# Patient Record
Sex: FEMALE | Race: WHITE
[De-identification: names, ages, dates, MRNs, and addresses within clinical notes are randomized per-mention and may not be internally consistent; named-entity substitution may affect disease eponyms.]

---

## 2018-05-14 ENCOUNTER — HOSPITAL ENCOUNTER (EMERGENCY)
Dept: HOSPITAL 17 - HOBED | Age: 23
Discharge: HOME | End: 2018-05-14
Payer: COMMERCIAL

## 2018-05-14 DIAGNOSIS — R11.0: ICD-10-CM

## 2018-05-14 DIAGNOSIS — O26.893: Primary | ICD-10-CM

## 2018-05-14 DIAGNOSIS — Z3A.36: ICD-10-CM

## 2018-05-14 PROCEDURE — 59025 FETAL NON-STRESS TEST: CPT

## 2018-05-14 NOTE — PD
HPI


Chief Complaint


Just did not feel good today


Date Seen:  May 14, 2018


Time Seen:  22:40


Travel History


International Travel<30 Days:  No


Contact w/Intl Traveler<30Days:  No


Known Affected Area:  No





History of Present Illness


HPI


23-year-old white female  36 weeks sees Dr. Whitehead for prenatal care and 

presents complaining today of hot flashes and nausea with no vomiting, no 

bleeding, leakage, or contractions.  The patient states she feels better now.  

NST is reactive no contractions


Weeks Gestation:  36


Para:  0


:  2





History


Obstetric History


Obstetric History


1 early pregnancy loss





Social History


Alcohol Use:  No


Tobacco Use:  No


Substance Abuse:  No





Allergies-Medications


(Allergen,Severity, Reaction):  


Coded Allergies:  


     No Known Allergies (Unverified , 2/12/15)


Home Meds


Active Scripts


Ondansetron (Zofran ODT) 4 Mg Tab, 4 MG SL QID Y for NAUSEA OR VOMITING, #10 TAB


   FOR NAUSEA/VOMITING


   Prov:Jasmin Gaspar MD         2/12/15


Reported Medications


Amoxicillin (Amoxicillin) Unknown Strength Cap, PO TID, CAP


   2/12/15


Miscellaneous (Birth Control Pills)  Tab, 1 TAB PO HS, TAB


   2/12/15





Review of Systems


General / Constitutional:  No: Fever, Weight Gain, Chills, Other


Eyes:  No: Diploplia, Blurred Vision, Visual changes, Pain, Photophobia


HENT:  No: Headaches, Vertigo, Lightheadedness


Cardiovascular:  No: Irregular Rhythm, Chest Pain or Discomfort, Palpitations, 

Tachycardia, Syncope, Varicosities, Edema, Cyanosis


Respiratory:  No: Cough, Short of Breath, Other


Gastrointestinal:  No: Nausea, Vomiting, Diarrhea


Genitourinary:  No: Decreased Urinary Output, Oliguria


Musculoskeletal:  No: Limited ROM, Weakness, Cramping, Edema, Pain


Skin:  No Rash, No Itching, No Dryness, No Lumps, No Change in Pigmentation, No 

Change in Nails, No Alopecia, No Lesions


Neurologic:  No: Weakness, Dizziness, Syncope, Focal Abnormalities, 

Coordination Problem, Headache, Slurred Speech, Seizures


Psychiatric:  No: Depression, Suicidal Ideations, Homicidal Ideation


Endocrine:  No: Heat Intolerance, Cold Intolerance, Polydipsia, Polyuria, Other





Physical Exam


Narrative


GENERAL: Well-nourished, well-developed patient.


SKIN: Warm and dry.


HEAD: Normocephalic and atraumatic.


EYES: No scleral icterus. No injection or drainage. 


ENT: No nasal drainage noted. Mucous membranes pink. Airway patent.


NECK: Supple, trachea midline. No JVD.


CARDIOVASCULAR: Regular rate and rhythm without murmurs, gallops, or rubs. 


RESPIRATORY: Breath sounds equal bilaterally. No accessory muscle use.


BREASTS: Bilateral exam showed no masses , no retractions, no nipple discharge.


ABDOMEN/GI: Abdomen soft, non-tender, bowel sounds present, no rebound, no 

guarding 


   Gravid to [-36] weeks size


   Fundal Height: [-36]


GENITOURINARY: 


   External Genitalia: intact and normal in appearance


 


   Membranes: [intact  ]


   Uterine Contractions: [none-]


FHT's: 


   Category: [1-]   


   Baseline: [-133]   


   Reactive: [R-]   


   Variability: [-mod]  


   Decels: [-0]  


EXTREMITIES: No cyanosis or edema.


BACK: Nontender without obvious deformity. No CVA tenderness.


NEUROLOGICAL: Awake and alert. Motor and sensory grossly within normal limits. 

Five out of 5 muscle strength in all muscle groups. Normal speech.





Data


Data


Labs


Urine dip on OB ED is negative





MDM


Interpretation(s)


23-year-old white female  at 36 weeks who presents complaining of hot 

flashes and nausea today.  She had no vomiting.  She felt she describes a just 

not well at all for a while and then that resolved and now she feels okay, NST 

is reactive, no contractions seen.  Urinalysis negative


Plan


Plan to discharge patient home to observation and she will hydrate at home, use 

Tylenol as needed and heating pad or hot bath for symptom relief.  She is to 

follow-up with Dr. Whitehead for any more issues


Diagnosis


Diagnosis:  


 Primary Impression:  


 Malaise


 Additional Impressions:  


 Hot flashes


 Nausea alone


 36 weeks gestation of pregnancy


Disposition:  01 DISCHARGE HOME


Condition:  Stable











Dilshad Perez II, MD May 14, 2018 22:46

## 2018-05-25 ENCOUNTER — HOSPITAL ENCOUNTER (EMERGENCY)
Dept: HOSPITAL 17 - HOBED | Age: 23
Discharge: HOME | End: 2018-05-25
Payer: COMMERCIAL

## 2018-05-25 DIAGNOSIS — Z3A.37: ICD-10-CM

## 2018-05-25 DIAGNOSIS — O16.3: Primary | ICD-10-CM

## 2018-05-25 LAB
ALBUMIN SERPL-MCNC: 2.8 GM/DL (ref 3.4–5)
ALP SERPL-CCNC: 132 U/L (ref 45–117)
ALT SERPL-CCNC: 25 U/L (ref 10–53)
AST SERPL-CCNC: 17 U/L (ref 15–37)
BACTERIA #/AREA URNS HPF: (no result) /HPF
BASOPHILS # BLD AUTO: 0 TH/MM3 (ref 0–0.2)
BASOPHILS NFR BLD: 0.2 % (ref 0–2)
BILIRUB SERPL-MCNC: 0.4 MG/DL (ref 0.2–1)
BUN SERPL-MCNC: 9 MG/DL (ref 7–18)
CALCIUM SERPL-MCNC: 9 MG/DL (ref 8.5–10.1)
CHLORIDE SERPL-SCNC: 107 MEQ/L (ref 98–107)
COLOR UR: YELLOW
CREAT SERPL-MCNC: 0.82 MG/DL (ref 0.5–1)
EOSINOPHIL # BLD: 0 TH/MM3 (ref 0–0.4)
EOSINOPHIL NFR BLD: 0.6 % (ref 0–4)
ERYTHROCYTE [DISTWIDTH] IN BLOOD BY AUTOMATED COUNT: 13.3 % (ref 11.6–17.2)
GFR SERPLBLD BASED ON 1.73 SQ M-ARVRAT: 85 ML/MIN (ref 89–?)
GLUCOSE SERPL-MCNC: 108 MG/DL (ref 74–106)
GLUCOSE UR STRIP-MCNC: (no result) MG/DL
HCO3 BLD-SCNC: 20.2 MEQ/L (ref 21–32)
HCT VFR BLD CALC: 38.6 % (ref 35–46)
HGB BLD-MCNC: 13.3 GM/DL (ref 11.6–15.3)
HGB UR QL STRIP: (no result)
KETONES UR STRIP-MCNC: (no result) MG/DL
LYMPHOCYTES # BLD AUTO: 1.5 TH/MM3 (ref 1–4.8)
LYMPHOCYTES NFR BLD AUTO: 17.7 % (ref 9–44)
MCH RBC QN AUTO: 30.8 PG (ref 27–34)
MCHC RBC AUTO-ENTMCNC: 34.4 % (ref 32–36)
MCV RBC AUTO: 89.5 FL (ref 80–100)
MONOCYTE #: 0.6 TH/MM3 (ref 0–0.9)
MONOCYTES NFR BLD: 7.1 % (ref 0–8)
NEUTROPHILS # BLD AUTO: 6.1 TH/MM3 (ref 1.8–7.7)
NEUTROPHILS NFR BLD AUTO: 74.4 % (ref 16–70)
NITRITE UR QL STRIP: (no result)
PLATELET # BLD: 263 TH/MM3 (ref 150–450)
PMV BLD AUTO: 7.8 FL (ref 7–11)
PROT SERPL-MCNC: 6.7 GM/DL (ref 6.4–8.2)
RBC # BLD AUTO: 4.31 MIL/MM3 (ref 4–5.3)
SODIUM SERPL-SCNC: 137 MEQ/L (ref 136–145)
SP GR UR STRIP: 1.02 (ref 1–1.03)
SQUAMOUS #/AREA URNS HPF: 15 /HPF (ref 0–5)
URINE LEUKOCYTE ESTERASE: (no result)
WBC # BLD AUTO: 8.2 TH/MM3 (ref 4–11)

## 2018-05-25 PROCEDURE — 81001 URINALYSIS AUTO W/SCOPE: CPT

## 2018-05-25 PROCEDURE — 85025 COMPLETE CBC W/AUTO DIFF WBC: CPT

## 2018-05-25 PROCEDURE — 59025 FETAL NON-STRESS TEST: CPT

## 2018-05-25 PROCEDURE — 80053 COMPREHEN METABOLIC PANEL: CPT

## 2018-05-25 PROCEDURE — 36415 COLL VENOUS BLD VENIPUNCTURE: CPT

## 2018-05-25 NOTE — PD
HPI


Chief Complaint


hypertension in office


Date Seen:  May 25, 2018


Time Seen:  13:00


Travel History


International Travel<30 Days:  No


Contact w/Intl Traveler<30Days:  No


Known Affected Area:  No





History of Present Illness


HPI


22 yo at 37 6/7 weeks with BP in office of 140/100 no HA or visual disturbance. 

She is doing well and has had good PNC


Weeks Gestation:  37


Para:  0


:  1


Last Menstrual Period:  May 25, 2018





History


Past Medical History


Medical History:  Denies Significant Hx





Past Surgical History


Surgical History:  No Previous Surgery





Family History


Family History:  Negative





Social History


Alcohol Use:  No


Tobacco Use:  No


Substance Abuse:  No





Allergies-Medications


(Allergen,Severity, Reaction):  


Coded Allergies:  


     No Known Allergies (Unverified , 2/12/15)


Home Meds


Active Scripts


Ondansetron (Zofran ODT) 4 Mg Tab, 4 MG SL QID Y for NAUSEA OR VOMITING, #10 TAB


   FOR NAUSEA/VOMITING


   Prov:Jasmin Gaspar MD         2/12/15


Reported Medications


Amoxicillin (Amoxicillin) Unknown Strength Cap, PO TID, CAP


   2/12/15


Miscellaneous (Birth Control Pills)  Tab, 1 TAB PO HS, TAB


   2/12/15





Review of Systems


Except as stated in HPI:  all other systems reviewed are Neg





Physical Exam


Narrative


GENERAL: Well-nourished, well-developed patient.


SKIN: Warm and dry.


HEAD: Normocephalic and atraumatic.


EYES: No scleral icterus. No injection or drainage. 


ENT: No nasal drainage noted. Mucous membranes pink. Airway patent.


NECK: Supple, trachea midline. No JVD.


CARDIOVASCULAR: Regular rate and rhythm without murmurs, gallops, or rubs. 


RESPIRATORY: Breath sounds equal bilaterally. No accessory muscle use.


BREASTS: Bilateral exam showed no masses , no retractions, no nipple discharge.


ABDOMEN/GI: Abdomen soft, non-tender, bowel sounds present, no rebound, no 

guarding 


   Gravid to 37 weeks size


   Fundal Height: [-]


GENITOURINARY: 


   External Genitalia: intact and normal in appearance


   BUS glands: [-]


   Cervix: [-]


   Dilatation: ft        


   Effacement: [-]          


   Station: -2


   Presentation: [-]        


   Membranes: [intact


   Uterine Contractions: [-]


FHT's: 


   Category: 1   


   Baseline: [-]   


   Reactive: [-]   


   Variability: [-]  


   Decels: [-]  


EXTREMITIES: No cyanosis or edema.


BACK: Nontender without obvious deformity. No CVA tenderness.


NEUROLOGICAL: Awake and alert. Motor and sensory grossly within normal limits. 

Five out of 5 muscle strength in all muscle groups. Normal speech.





Data


Data


Vital Signs Reviewed:  Yes


Orders





 Orders


Complete Blood Count With Diff (18 11:53)


Comprehensive Metabolic Panel (18 11:53)


Urinalysis - C+S If Indicated (18 11:53)


Group B Strep:  Negative


Labs





Laboratory Tests








Test


  18


11:48


 


White Blood Count 8.2 


 


Red Blood Count 4.31 


 


Hemoglobin 13.3 


 


Hematocrit 38.6 


 


Mean Corpuscular Volume 89.5 


 


Mean Corpuscular Hemoglobin 30.8 


 


Mean Corpuscular Hemoglobin


Concent 34.4 


 


 


Red Cell Distribution Width 13.3 


 


Platelet Count 263 


 


Mean Platelet Volume 7.8 


 


Neutrophils (%) (Auto) 74.4 


 


Lymphocytes (%) (Auto) 17.7 


 


Monocytes (%) (Auto) 7.1 


 


Eosinophils (%) (Auto) 0.6 


 


Basophils (%) (Auto) 0.2 


 


Neutrophils # (Auto) 6.1 


 


Lymphocytes # (Auto) 1.5 


 


Monocytes # (Auto) 0.6 


 


Eosinophils # (Auto) 0.0 


 


Basophils # (Auto) 0.0 


 


CBC Comment DIFF FINAL 


 


Differential Comment  


 


Urine Color YELLOW 


 


Urine Turbidity HAZY 


 


Urine pH 5.5 


 


Urine Specific Gravity 1.023 


 


Urine Protein TRACE 


 


Urine Glucose (UA) NEG 


 


Urine Ketones NEG 


 


Urine Occult Blood NEG 


 


Urine Nitrite NEG 


 


Urine Bilirubin NEG 


 


Urine Urobilinogen LESS THAN 2.0 


 


Urine Leukocyte Esterase MOD 


 


Urine RBC 7 


 


Urine WBC 4 


 


Urine Squamous Epithelial


Cells 15 


 


 


Urine Bacteria OCC 


 


Microscopic Urinalysis Comment


  CULT NOT


INDICATED


 


Blood Urea Nitrogen 9 


 


Creatinine 0.82 


 


Random Glucose 108 


 


Total Protein 6.7 


 


Albumin 2.8 


 


Calcium Level 9.0 


 


Alkaline Phosphatase 132 


 


Aspartate Amino Transf


(AST/SGOT) 17 


 


 


Alanine Aminotransferase


(ALT/SGPT) 25 


 


 


Total Bilirubin 0.4 


 


Sodium Level 137 


 


Potassium Level 3.7 


 


Chloride Level 107 


 


Carbon Dioxide Level 20.2 


 


Anion Gap 10 


 


Estimat Glomerular Filtration


Rate 85 


 











MDM


Diagnosis


Diagnosis:  


 Primary Impression:  


 37 weeks gestation of pregnancy


 Additional Impression:  


 Hypertension


Disposition:  01 DISCHARGE HOME


Condition:  Good


Patient Instructions:  General Instructions, Having Your Baby: The Labor 

Process (GEN), Fetal Movement (ED)


Departure Forms:  Tests/Procedures











Mickey De La Rosa MD May 25, 2018 13:18

## 2018-05-25 NOTE — HHI.DCPOC
Discharge Care Plan


Diagnosis:  


(1) 37 weeks gestation of pregnancy


Report Symptoms to Your Doctor


-Temperature above 100.5 degrees


-Redness, of incision or excessive or foul smelling drainage


-Unusual pain or calf pain


-Increased vaginal bleeding


-Painful or difficulty urinating


-Feelings of extreme sadness or anxiety after 2 weeks


Goals to Promote Your Health


* To prevent worsening of your condition and complications


* To maintain your health at the optimal level


Directions to Meet Your Goals


*** Take your medications as prescribed


*** Follow your dietary instruction


*** Follow activity as directed


*** Ensure plenty of rest for recovery


*** Drink fluids for hydration








*** Keep your appointments as scheduled


*** Take your immunizations and boosters as scheduled


*** If your symptoms worsen call your PCP, if no PCP go to Urgent Care Center 

or Emergency Room***


*** Smoking is Dangerous to Your Health. Avoid second hand smoke***


***Call the 24-hour crisis hotline for domestic abuse at 1-946.991.6761***











Mickey De La Rosa MD May 25, 2018 13:19

## 2018-06-08 ENCOUNTER — HOSPITAL ENCOUNTER (INPATIENT)
Dept: HOSPITAL 17 - H2EB | Age: 23
LOS: 2 days | Discharge: HOME | End: 2018-06-10
Attending: OBSTETRICS & GYNECOLOGY | Admitting: OBSTETRICS & GYNECOLOGY
Payer: COMMERCIAL

## 2018-06-08 VITALS — HEIGHT: 69 IN | BODY MASS INDEX: 32.65 KG/M2 | WEIGHT: 220.46 LBS

## 2018-06-08 DIAGNOSIS — Z3A.39: ICD-10-CM

## 2018-06-08 LAB
AMORPHOUS SEDIMENT, URINE: (no result)
BACTERIA #/AREA URNS HPF: (no result) /HPF
BASOPHILS # BLD AUTO: 0 TH/MM3 (ref 0–0.2)
BASOPHILS NFR BLD: 0.4 % (ref 0–2)
COLOR UR: YELLOW
EOSINOPHIL # BLD: 0 TH/MM3 (ref 0–0.4)
EOSINOPHIL NFR BLD: 0.5 % (ref 0–4)
ERYTHROCYTE [DISTWIDTH] IN BLOOD BY AUTOMATED COUNT: 13.2 % (ref 11.6–17.2)
GLUCOSE UR STRIP-MCNC: (no result) MG/DL
HCT VFR BLD CALC: 37.2 % (ref 35–46)
HGB BLD-MCNC: 13.4 GM/DL (ref 11.6–15.3)
HGB UR QL STRIP: (no result)
KETONES UR STRIP-MCNC: (no result) MG/DL
LYMPHOCYTES # BLD AUTO: 1.6 TH/MM3 (ref 1–4.8)
LYMPHOCYTES NFR BLD AUTO: 19.7 % (ref 9–44)
MCH RBC QN AUTO: 31.9 PG (ref 27–34)
MCHC RBC AUTO-ENTMCNC: 36 % (ref 32–36)
MCV RBC AUTO: 88.6 FL (ref 80–100)
MONOCYTE #: 0.6 TH/MM3 (ref 0–0.9)
MONOCYTES NFR BLD: 7 % (ref 0–8)
MUCOUS THREADS #/AREA URNS LPF: (no result) /LPF
NEUTROPHILS # BLD AUTO: 6.1 TH/MM3 (ref 1.8–7.7)
NEUTROPHILS NFR BLD AUTO: 72.4 % (ref 16–70)
NITRITE UR QL STRIP: (no result)
PLATELET # BLD: 249 TH/MM3 (ref 150–450)
PMV BLD AUTO: 8.4 FL (ref 7–11)
RBC # BLD AUTO: 4.19 MIL/MM3 (ref 4–5.3)
SP GR UR STRIP: 1.03 (ref 1–1.03)
SQUAMOUS #/AREA URNS HPF: 8 /HPF (ref 0–5)
TRANS CELLS #/AREA URNS HPF: <1 /HPF
URINE LEUKOCYTE ESTERASE: (no result)
WBC # BLD AUTO: 8.4 TH/MM3 (ref 4–11)

## 2018-06-08 PROCEDURE — 86901 BLOOD TYPING SEROLOGIC RH(D): CPT

## 2018-06-08 PROCEDURE — 3E0S3BZ INTRODUCTION OF ANESTHETIC AGENT INTO EPIDURAL SPACE, PERCUTANEOUS APPROACH: ICD-10-PCS | Performed by: OBSTETRICS & GYNECOLOGY

## 2018-06-08 PROCEDURE — 86900 BLOOD TYPING SEROLOGIC ABO: CPT

## 2018-06-08 PROCEDURE — 59025 FETAL NON-STRESS TEST: CPT

## 2018-06-08 PROCEDURE — 80307 DRUG TEST PRSMV CHEM ANLYZR: CPT

## 2018-06-08 PROCEDURE — 90715 TDAP VACCINE 7 YRS/> IM: CPT

## 2018-06-08 PROCEDURE — 85025 COMPLETE CBC W/AUTO DIFF WBC: CPT

## 2018-06-08 PROCEDURE — 0KQM0ZZ REPAIR PERINEUM MUSCLE, OPEN APPROACH: ICD-10-PCS | Performed by: OBSTETRICS & GYNECOLOGY

## 2018-06-08 PROCEDURE — G0481 DRUG TEST DEF 8-14 CLASSES: HCPCS

## 2018-06-08 PROCEDURE — 81001 URINALYSIS AUTO W/SCOPE: CPT

## 2018-06-08 RX ADMIN — BENZOCAINE PRN SPRAY: 11.4 AEROSOL, SPRAY TOPICAL at 22:50

## 2018-06-08 RX ADMIN — WITCH HAZEL PRN APPLIC: 500 SOLUTION RECTAL; TOPICAL at 22:50

## 2018-06-08 NOTE — PD.OB.DELI
Weeks gestation:  39


Gest age assessed date:  2018


Gest age assessed time:  09:00


Pt started active labor?:  Yes


Active labor start date:  2018


Active labor start time:  09:00


Medical induction of labor?:  No


Artificial rupture of membrane:  Yes


Artificial ROM date:  2018


Artifical ROM time:  13:00


Anesthesia:  Epidural


Episiotomy:  None


Vaginal Delivery:  Normal, Spontaneous


Presentation:  Occiput anterior


Nuchal Cord:  None


Delayed cord clamping (45 sec):  Yes


Infant:  Female, Single


Delivery date:  2018


Delivery time:  20:30


One Minute APGAR:  9


Five Minute APGAR:  9


Placenta:  Spontaneous delivery, Intact


Laceration:  Vaginal laceration, 2 deg


Repair:  Chromic running


Estimated blood loss:  300











Mickey De La Rosa MD 2018 20:51

## 2018-06-08 NOTE — HHI.HP
HPI


Chief Complaint


induction at 39 weeks


Date Seen:  2018


Time Seen:  09:00


Travel History


International Travel<30 Days:  No


Contact w/Intl Traveler<30Days:  No


Known Affected Area:  No





History of Present Illness


HPI


22 yo WF  here for induction. She is 3 cm and ok with AROM and pitocin


Weeks Gestation:  39


Para:  0


:  1





History


Past Medical History


Medical History:  Denies Significant Hx





Past Surgical History


Surgical History:  No Previous Surgery





Family History


Family History:  





Social History


Alcohol Use:  No


Tobacco Use:  No


Substance Abuse:  No





Allergies-Medications


(Allergen,Severity, Reaction):  


Coded Allergies:  


     No Known Allergies (Unverified  Allergy, Unknown, 18)


Home Meds


Active Scripts


Ondansetron (Zofran ODT) 4 Mg Tab, 4 MG SL QID Y for NAUSEA OR VOMITING, #10 TAB


   FOR NAUSEA/VOMITING


   Prov:Jasmin Gaspar MD         2/12/15


Reported Medications


Amoxicillin (Amoxicillin) Unknown Strength Cap, PO TID, CAP


   2/12/15


Miscellaneous (Birth Control Pills)  Tab, 1 TAB PO HS, TAB


   2/12/15





Review of Systems


Except as stated in HPI:  all other systems reviewed are Neg





Physical Exam


Narrative


GENERAL: Well-nourished, well-developed patient.


SKIN: Warm and dry.


HEAD: Normocephalic and atraumatic.


EYES: No scleral icterus. No injection or drainage. 


ENT: No nasal drainage noted. Mucous membranes pink. Airway patent.


NECK: Supple, trachea midline. No JVD.


CARDIOVASCULAR: Regular rate and rhythm without murmurs, gallops, or rubs. 


RESPIRATORY: Breath sounds equal bilaterally. No accessory muscle use.


BREASTS: Bilateral exam showed no masses , no retractions, no nipple discharge.


ABDOMEN/GI: Abdomen soft, non-tender, bowel sounds present, no rebound, no 

guarding 


   Gravid to 39 weeks size


   Fundal Height: [-]


GENITOURINARY: 


   External Genitalia: intact and normal in appearance


   BUS glands: [-]


   Cervix: [-]


   Dilatation: 3         


   Effacement: [-]          


   Station: [-]  


   Presentation: vtx       


   Membranes: 


   Uterine Contractions: [-]


FHT's: 


   Category: 1 


   Baseline: [-]   


   Reactive: [-]   


   Variability: [-]  


   Decels: [-]  


EXTREMITIES: No cyanosis or edema.


BACK: Nontender without obvious deformity. No CVA tenderness.


NEUROLOGICAL: Awake and alert. Motor and sensory grossly within normal limits. 

Five out of 5 muscle strength in all muscle groups. Normal speech.





Caprini VTE Risk Assessment


Caprini VTE Risk Assessment:  No/Low Risk (score <= 1)


Caprini Risk Assessment Model











 Point Value = 1          Point Value = 2  Point Value = 3  Point Value = 5


 


Age 41-60


Minor surgery


BMI > 25 kg/m2


Swollen legs


Varicose veins


Pregnancy or postpartum


History of unexplained or recurrent


   spontaneous 


Oral contraceptives or hormone


   replacement


Sepsis (< 1 month)


Serious lung disease, including


   pneumonia (< 1 month)


Abnormal pulmonary function


Acute myocardial infarction


Congestive heart failure (< 1 month)


History of inflammatory bowel disease


Medical patient at bed rest Age 61-74


Arthroscopic surgery


Major open surgery (> 45 min)


Laparoscopic surgery (> 45 min)


Malignancy


Confined to bed (> 72 hours)


Immobilizing plaster cast


Central venous access Age >= 75


History of VTE


Family history of VTE


Factor V Leiden


Prothrombin [id number]


Lupus anticoagulant


Anticardiolipin antibodies


Elevated serum homocysteine


Heparin-induced thrombocytopenia


Other congenital or acquired


   thrombophilia Stroke (< 1 month)


Elective arthroplasty


Hip, pelvis, or leg fracture


Acute spinal cord injury (< 1 month)








Prophylaxis Regimen











   Total Risk


Factor Score Risk Level Prophylaxis Regimen


 


0-1      Low Early ambulation


 


2 Moderate Order ONE of the following:


*Sequential Compression Device (SCD)


*Heparin 5000 units SQ BID


 


3-4 Higher Order ONE of the following medications:


*Heparin 5000 units SQ TID


*Enoxaparin/Lovenox 40 mg SQ daily (WT < 150 kg, CrCl > 30 mL/min)


*Enoxaparin/Lovenox 30 mg SQ daily (WT < 150 kg, CrCl > 10-29 mL/min)


*Enoxaparin/Lovenox 30 mg SQ BID (WT < 150 kg, CrCl > 30 mL/min)


AND/OR


*Sequential Compression Device (SCD)


 


5 or more Highest Order ONE of the following medications:


*Heparin 5000 units SQ TID (Preferred with Epidurals)


*Enoxaparin/Lovenox 40 mg SQ daily (WT < 150 kg, CrCl > 30 mL/min)


*Enoxaparin/Lovenox 30 mg SQ daily (WT < 150 kg, CrCl > 10-29 mL/min)


*Enoxaparin/Lovenox 30 mg SQ BID (WT < 150 kg, CrCl > 30 mL/min)


AND


*Sequential Compression Device (SCD)











Data


Data


Vital Signs Reviewed:  Yes


Orders





 Orders


Admit To Inpatient (18 )


Code Status (18 09:14)


Vital Signs (Adult) .Per protocol (18 09:14)


Fetal Heart (18 09:14)


Amnioinfusion (18 09:14)


Urinary Catheter Management .ONCE (18 09:14)


Lactated Ringer's 1000 Ml Inj (Lr 1000 M (18 09:14)


Lactated Ringer's 1000 Ml Inj (Lr 1000 M (18 09:14)


Sodium Chlorid 0.9% 500 Ml Inj (Ns 500 M (18 09:15)


Sodium Chlor 0.9% 1000 Ml Inj (Ns 1000 M (18 09:34)


Lidocaine 1% Inj (50 Ml) (Xylocaine 1% I (18 09:15)


Citric Acid-Sodium Citrate Liq (Bicitra (18 09:15)


Fentanyl Inj (Fentanyl Inj) (18 09:15)


Fentanyl Inj (Fentanyl Inj) (18 09:15)


Complete Blood Count With Diff (18 09:14)


Hold Clot (18 09:14)


Abo/Rh Blood Type (18 09:14)


Urinalysis - C+S If Indicated (18 09:14)


Ob/Psych Drug Screen, Urine (18 09:14)


Resp Oxygen Non Rebreathe Mask (18 )


^ Epidural / Intrathecal Infus (18 09:14)


Oxytocin 30 Units-500ml Premix (Pitocin (18 09:15)


Lidocaine 1% Inj (50 Ml) (Xylocaine 1% I (18 09:15)


Light Mineral Oil (Muri-Lube Oil) (18 09:15)


Inpatient Certification (18 )


Specimen To Be Collected PRN (18 09:14)


Specimen To Be Collected PRN (18 09:14)


^ Non Stress Test (18 09:14)


Response To Medication .Post New Med Administration, Reaction (18 09:14)


^ Discontinue Medication (18 09:14)


Oxytocin 30 Units-500ml Premix (Pitocin (18 09:15)


Diet Clear Liquid (18 Lunch)


Fentanyl 2mcg-Bupiv 0.125% Inj (Fentanyl (18 14:29)


Ephedrine/Ns 25 Mg/5 Ml Syr (Ephedrine/N (18 14:29)


Lido-Epi Pf 1.5%-1:200,000 Inj (Xylocain (18 14:32)


^ Place On Chart (18 )


^ Medication Indications (18 )


Consent (18 )


^ No Systemic Narcotics (18 )


^ Call Anesthesiologist (18 )


^ Discontinue Epidural Cathete (18 )


Anticoagulant Alert (18 )


^ Epidural Alert (18 )


Nursing Information (Misc Nursing Inform (18 15:45)


Nursing Information (Misc Nursing Inform (18 15:45)


Fentanyl Inj (Fentanyl Inj) (18 15:45)


Fentanyl 2mcg-Bupiv 0.125% Inj (Fentanyl (18 15:45)


Ephedrine/Ns 25 Mg/5 Ml Syr (Ephedrine/N (18 15:45)


Group B Strep:  Negative


Labs





Laboratory Tests








Test


  18


09:45


 


White Blood Count 8.4 


 


Red Blood Count 4.19 


 


Hemoglobin 13.4 


 


Hematocrit 37.2 


 


Mean Corpuscular Volume 88.6 


 


Mean Corpuscular Hemoglobin 31.9 


 


Mean Corpuscular Hemoglobin


Concent 36.0 


 


 


Red Cell Distribution Width 13.2 


 


Platelet Count 249 


 


Mean Platelet Volume 8.4 


 


Neutrophils (%) (Auto) 72.4 


 


Lymphocytes (%) (Auto) 19.7 


 


Monocytes (%) (Auto) 7.0 


 


Eosinophils (%) (Auto) 0.5 


 


Basophils (%) (Auto) 0.4 


 


Neutrophils # (Auto) 6.1 


 


Lymphocytes # (Auto) 1.6 


 


Monocytes # (Auto) 0.6 


 


Eosinophils # (Auto) 0.0 


 


Basophils # (Auto) 0.0 


 


CBC Comment AUTO DIFF 


 


Differential Comment


  AUTO DIFF


CONFIRMED


 


Urine Color YELLOW 


 


Urine Turbidity HAZY 


 


Urine pH 5.5 


 


Urine Specific Gravity 1.027 


 


Urine Protein TRACE 


 


Urine Glucose (UA) NEG 


 


Urine Ketones NEG 


 


Urine Occult Blood NEG 


 


Urine Nitrite NEG 


 


Urine Bilirubin NEG 


 


Urine Urobilinogen LESS THAN 2.0 


 


Urine Leukocyte Esterase SMALL 


 


Urine RBC 2 


 


Urine WBC 5 


 


Urine Squamous Epithelial


Cells 8 


 


 


Urine Transitional Epithelial


Cells <1 


 


 


Urine Amorphous Sediment RARE 


 


Urine Bacteria OCC 


 


Urine Mucus FEW 


 


Microscopic Urinalysis Comment


  CULT NOT


INDICATED


 


Urine Opiates Screen NEG 


 


Urine Barbiturates Screen NEG 


 


Urine Amphetamines Screen NEG 


 


Urine Benzodiazepines Screen NEG 


 


Urine Cocaine Screen NEG 


 


Urine Cannabinoids Screen NEG 











Assessment/Plan


Problem List:  


(1) 39 weeks gestation of pregnancy


ICD Codes:  Z3A.39 - 39 weeks gestation of pregnancy


Assessment and Plan


induction with favorable Mickey Ochoa MD 2018 17:14

## 2018-06-09 VITALS
DIASTOLIC BLOOD PRESSURE: 62 MMHG | SYSTOLIC BLOOD PRESSURE: 114 MMHG | TEMPERATURE: 98.9 F | HEART RATE: 74 BPM | RESPIRATION RATE: 17 BRPM

## 2018-06-09 RX ADMIN — IBUPROFEN PRN MG: 800 TABLET, FILM COATED ORAL at 20:44

## 2018-06-09 RX ADMIN — IBUPROFEN PRN MG: 800 TABLET, FILM COATED ORAL at 12:41

## 2018-06-09 RX ADMIN — ACETAMINOPHEN PRN MG: 325 TABLET ORAL at 23:48

## 2018-06-09 RX ADMIN — WITCH HAZEL PRN APPLIC: 500 SOLUTION RECTAL; TOPICAL at 20:43

## 2018-06-09 RX ADMIN — ACETAMINOPHEN PRN MG: 325 TABLET ORAL at 11:09

## 2018-06-09 RX ADMIN — IBUPROFEN PRN MG: 800 TABLET, FILM COATED ORAL at 04:28

## 2018-06-09 RX ADMIN — STANDARDIZED SENNA CONCENTRATE AND DOCUSATE SODIUM PRN TAB: 8.6; 5 TABLET, FILM COATED ORAL at 20:43

## 2018-06-09 NOTE — HHI.DCPOC
Discharge Care Plan


Diagnosis:  


(1) Spontaneous vaginal delivery


Report Symptoms to Your Doctor


-Temperature above 100.5 degrees


-Redness, of incision or excessive or foul smelling drainage


-Unusual pain or calf pain


-Increased vaginal bleeding


-Painful or difficulty urinating


-Feelings of extreme sadness or anxiety after 2 weeks


Goals to Promote Your Health


* To prevent worsening of your condition and complications


* To maintain your health at the optimal level


Directions to Meet Your Goals


*** Take your medications as prescribed


*** Follow your dietary instruction


*** Follow activity as directed


*** Ensure plenty of rest for recovery


*** Drink fluids for hydration








*** Keep your appointments as scheduled


*** Take your immunizations and boosters as scheduled


*** If your symptoms worsen call your PCP, if no PCP go to Urgent Care Center 

or Emergency Room***


*** Smoking is Dangerous to Your Health. Avoid second hand smoke***


***Call the 24-hour crisis hotline for domestic abuse at 1-202.903.4220***











Mickey De La Rosa MD Jun 9, 2018 07:56

## 2018-06-09 NOTE — HHI.OB
Subjective


Post Partum Day:  1


Remarks


doing well





Objective


Vitals/I&O











Intake & Output  


 


 6/9/18 6/9/18





 07:00 19:00


 


Intake Total 500 ml 


 


Balance 500 ml 


 


  


 


Intake IV Total 500 ml 








Objective Remarks


GENERAL: Well-nourished, well-developed patient.


.


ABDOMEN/GI: Abdomen soft, non-tender. 


   Fundus: Firm, non-tender at umbilicus.


GENITOURINARY: Light to moderate bleeding.


EXTREMITIES: No cyanosis or edema, non-tender, without signs of DVT.


Medications and IVs





Current Medications








 Medications


  (Trade)  Dose


 Ordered  Sig/Mark


 Route  Start Time


 Stop Time Status Last Admin


 


  (Misc Nursing


 Information)  No systemic


 narcotics to be


 given except...  UNSCH  PRN


 .XX  6/8/18 15:45


 6/9/18 15:44   


 


 


  (Misc Nursing


 Information)  DO NOT


 ADMINISTER ANY


 ANTICOAGUL...  UNSCH  PRN


 .XX  6/8/18 15:45


 6/9/18 15:44   


 


 


  (NS Flush)  2 ml  BID


 IV FLUSH  6/8/18 21:00


     


 


 


  (NS Flush)  2 ml  UNSCH  PRN


 IV FLUSH  6/8/18 21:00


     


 


 


  (Tylenol)  650 mg  Q4H  PRN


 PO  6/8/18 21:00


     


 


 


  (Motrin)  800 mg  Q8H  PRN


 PO  6/8/18 21:00


    6/9/18 04:28


 


 


  (Americaine 20%


 Top Spr)  1 spray  Q4H  PRN


 TOPICAL  6/8/18 21:00


    6/8/18 22:50


 


 


  (Tucks Pads)  1 applic  QID  PRN


 TOPICAL  6/8/18 21:00


    6/8/18 22:50


 


 


  (Lana-Colace)  2 tab  Q12H  PRN


 PO  6/8/18 21:00


     


 


 


  (Ambien)  5 mg  HS  PRN


 PO  6/8/18 21:00


     


 


 


  (M-M-R Ii Inj)  0.5 ml  ONCE ONCE


 SQ  6/9/18 16:00


 6/9/18 16:01   


 


 


  (Mag-Al Plus


 Susp Liq)  15 ml  Q8H  PRN


 PO  6/8/18 21:00


     


 


 


  (Zofran  Odt)  4 mg  Q6H  PRN


 PO  6/8/18 21:00


     


 


 


  (Boostrix Inj)  0.5 ml  ONCE ONCE


 IM  6/9/18 16:00


 6/9/18 16:01   


 











Assessment/Plan


Problem List:  


(1) 39 weeks gestation of pregnancy


ICD Codes:  Z3A.39 - 39 weeks gestation of pregnancy


(2) Spontaneous vaginal delivery


ICD Codes:  O80 - Encounter for full-term uncomplicated delivery


Assessment and Plan


doing well PP normal locMickey Turner MD Jun 9, 2018 07:55

## 2018-06-10 RX ADMIN — IBUPROFEN PRN MG: 800 TABLET, FILM COATED ORAL at 06:20

## 2018-06-10 RX ADMIN — BENZOCAINE PRN SPRAY: 11.4 AEROSOL, SPRAY TOPICAL at 12:37

## 2018-06-10 RX ADMIN — STANDARDIZED SENNA CONCENTRATE AND DOCUSATE SODIUM PRN TAB: 8.6; 5 TABLET, FILM COATED ORAL at 12:36

## 2018-06-10 RX ADMIN — ACETAMINOPHEN PRN MG: 325 TABLET ORAL at 12:36

## 2018-06-10 RX ADMIN — WITCH HAZEL PRN APPLIC: 500 SOLUTION RECTAL; TOPICAL at 12:37

## 2018-06-10 NOTE — HHI.OB
Subjective


Post Partum Day:  2


Remarks


doing well





Objective


Vitals/I&O





Vital Signs








  Date Time  Temp Pulse Resp B/P (MAP) Pulse Ox O2 Delivery O2 Flow Rate FiO2


 


6/9/18 20:10 98.9 74 17 114/62 (79)    








Objective Remarks


GENERAL: Well-nourished, well-developed patient.


.


ABDOMEN/GI: Abdomen soft, non-tender. 


   Fundus: Firm, non-tender at umbilicus.


GENITOURINARY: Light to moderate bleeding.


EXTREMITIES: No cyanosis or edema, non-tender, without signs of DVT.


Medications and IVs





Current Medications








 Medications


  (Trade)  Dose


 Ordered  Sig/Mark


 Route  Start Time


 Stop Time Status Last Admin


 


  (NS Flush)  2 ml  BID


 IV FLUSH  6/8/18 21:00


     


 


 


  (NS Flush)  2 ml  UNSCH  PRN


 IV FLUSH  6/8/18 21:00


     


 


 


  (Tylenol)  650 mg  Q4H  PRN


 PO  6/8/18 21:00


    6/9/18 23:48


 


 


  (Motrin)  800 mg  Q8H  PRN


 PO  6/8/18 21:00


    6/10/18 06:20


 


 


  (Americaine 20%


 Top Spr)  1 spray  Q4H  PRN


 TOPICAL  6/8/18 21:00


    6/8/18 22:50


 


 


  (Tucks Pads)  1 applic  QID  PRN


 TOPICAL  6/8/18 21:00


    6/9/18 20:43


 


 


  (Lana-Colace)  2 tab  Q12H  PRN


 PO  6/8/18 21:00


    6/9/18 20:43


 


 


  (Ambien)  5 mg  HS  PRN


 PO  6/8/18 21:00


     


 


 


  (Mag-Al Plus


 Susp Liq)  15 ml  Q8H  PRN


 PO  6/8/18 21:00


     


 


 


  (Zofran  Odt)  4 mg  Q6H  PRN


 PO  6/8/18 21:00


     


 











Assessment/Plan


Problem List:  


(1) 39 weeks gestation of pregnancy


ICD Codes:  Z3A.39 - 39 weeks gestation of pregnancy


(2) Spontaneous vaginal delivery


ICD Codes:  O80 - Encounter for full-term uncomplicated delivery


Assessment and Plan


doing well PP normal Mickey Fatima MD Wily 10, 2018 11:11

## 2018-06-10 NOTE — HHI.DS
Admission Date


2018 at 08:56


Discharge Date:  Wily 10, 2018


Admitting Diagnosis





Diagnosis:  


(1) Spontaneous vaginal delivery


Diagnosis:  Principal


ICD Codes:  O80 - Encounter for full-term uncomplicated delivery


Delivery Date:  2018


Vaginal Delivery:  Normal, Spontaneous


Infant:  Female, Single


Brief History


24 yo WF  here for induction. She is 3 cm and ok with AROM and pitocin


Hospital Course


 doing well ready for DC home


Pt Condition on Discharge:  Good


Discharge Disposition:  Discharge Home


Discharge Instructions


Diet Instructions:  As Tolerated, No Restrictions


Activities You Can Perform:  Pelvic Rest


Activities to Avoid:  Driving for 24 hrs


Follow up Referrals:  


OB/GYN - 2 Weeks @ [facility] with Mickey De La Rosa MD





New Medications:  


Oxycodone-Acetaminophen (Percocet) 5-325 mg Tab


1-2 TAB PO Q4H PRN for PAIN, #20 TAB 0 Refills





 


Discontinued Medications:  


Amoxicillin (Amoxicillin) Unknown Strength Cap


Unknown Dose PO TID, CAP





Miscellaneous (Birth Control Pills)  Tab


1 TAB PO HS, TAB





Ondansetron (Zofran ODT) 4 Mg Tab


4 MG SL QID PRN for NAUSEA OR VOMITING, #10 TAB


FOR NAUSEA/VOMITING














Mickey De La Rosa MD [place] 10, 2018 11:13